# Patient Record
Sex: FEMALE | Race: WHITE | NOT HISPANIC OR LATINO | URBAN - METROPOLITAN AREA
[De-identification: names, ages, dates, MRNs, and addresses within clinical notes are randomized per-mention and may not be internally consistent; named-entity substitution may affect disease eponyms.]

---

## 2018-06-25 ENCOUNTER — AMBULATORY - HEALTHEAST (OUTPATIENT)
Dept: MULTI SPECIALTY CLINIC | Facility: CLINIC | Age: 33
End: 2018-06-25

## 2018-06-25 LAB
HPV_EXT - HISTORICAL: NORMAL
PAP SMEAR - HIM PATIENT REPORTED: NORMAL

## 2019-04-12 ENCOUNTER — AMBULATORY - HEALTHEAST (OUTPATIENT)
Dept: LAB | Facility: CLINIC | Age: 34
End: 2019-04-12

## 2019-04-12 DIAGNOSIS — O99.810 ABNORMAL MATERNAL GLUCOSE TOLERANCE, ANTEPARTUM: ICD-10-CM

## 2019-04-12 LAB
FASTING STATUS PATIENT QL REPORTED: YES
GLUCOSE 1H P 100 G GLC PO SERPL-MCNC: 189 MG/DL (ref 70–179)
GLUCOSE 2H P 100 G GLC PO SERPL-MCNC: 141 MG/DL (ref 70–154)
GLUCOSE 3H P 100 G GLC PO SERPL-MCNC: 102 MG/DL (ref 70–139)
GLUCOSE P FAST SERPL-MCNC: 90 MG/DL (ref 70–94)

## 2019-04-16 ENCOUNTER — AMBULATORY - HEALTHEAST (OUTPATIENT)
Dept: SCHEDULING | Facility: CLINIC | Age: 34
End: 2019-04-16

## 2019-04-16 DIAGNOSIS — O26.00 EXCESSIVE WEIGHT GAIN DURING PREGNANCY: ICD-10-CM

## 2019-06-17 ENCOUNTER — ANESTHESIA - HEALTHEAST (OUTPATIENT)
Dept: OBGYN | Facility: CLINIC | Age: 34
End: 2019-06-17

## 2019-06-17 ENCOUNTER — ANESTHESIA - HEALTHEAST (OUTPATIENT)
Dept: ANESTHESIOLOGY | Facility: CLINIC | Age: 34
End: 2019-06-17

## 2019-06-18 ENCOUNTER — HOME CARE/HOSPICE - HEALTHEAST (OUTPATIENT)
Dept: HOME HEALTH SERVICES | Facility: HOME HEALTH | Age: 34
End: 2019-06-18

## 2019-06-19 ENCOUNTER — COMMUNICATION - HEALTHEAST (OUTPATIENT)
Dept: OBGYN | Facility: CLINIC | Age: 34
End: 2019-06-19

## 2019-06-24 ENCOUNTER — COMMUNICATION - HEALTHEAST (OUTPATIENT)
Dept: ADMINISTRATIVE | Facility: CLINIC | Age: 34
End: 2019-06-24

## 2019-06-27 ENCOUNTER — RECORDS - HEALTHEAST (OUTPATIENT)
Dept: ADMINISTRATIVE | Facility: OTHER | Age: 34
End: 2019-06-27

## 2019-06-27 ENCOUNTER — OFFICE VISIT - HEALTHEAST (OUTPATIENT)
Dept: MIDWIFE SERVICES | Facility: CLINIC | Age: 34
End: 2019-06-27

## 2019-06-27 DIAGNOSIS — N61.0 INFECTION OF BREAST, RIGHT: ICD-10-CM

## 2021-05-29 NOTE — TELEPHONE ENCOUNTER
Pt was diagnosed with mastitis and given antibiotics but is having problems with milk blebs. She has had success with hand expressing while in the bathtub but has trouble pumping. She would like to speak with someone to see if there is anything she can do or if she should be seen in clinic. Pt can be reached at [phone]. Ok to leave a message if she doesn't answer.

## 2021-05-29 NOTE — ANESTHESIA PROCEDURE NOTES
Epidural Block    Patient location during procedure: OB  Time Called: 6/17/2019 6:33 AM  Reason for Block:labor epidural  Staffing:  Performing  Anesthesiologist: Andrew Layton MD  Preanesthetic Checklist  Completed: patient identified, risks, benefits, and alternatives discussed, timeout performed, consent obtained, at patient's request, airway assessed, oxygen available, suction available, emergency drugs available and hand hygiene performed  Procedure  Patient position: sitting  Prep: ChloraPrep and site prepped and draped  Patient monitoring: continuous pulse oximetry, heart rate and blood pressure  Approach: midline  Location: L3-L4  Injection technique: SOBEIDA saline  Number of Attempts:1  Needle  Needle type: Tuohy   Needle gauge: 17 G     : 7.  Assessment  Sensory level:  No complications      Additional Notes:  Attempted CSE due to pt feeling need to push, but no CSF flow via 5-in 25g pencil-point needle inserted via Tuohy.  Epidural catheter threaded easily.  SOBEIDA @ 7 cm, taped @ 14 cm

## 2021-05-29 NOTE — TELEPHONE ENCOUNTER
OB Follow Up Phone Call    Patient: Edie Mccarhty  : 1985  MRN: [id number]     Location [facility]   Provider aldenr    :   N/A    Language:   na    Discharge Follow-Up:  Follow-Up call by Outreach nurse: talked with Edie    Type of Delivery:      Feeding Method:  Breastfeeding    Feedings in 24Hrs:  >8x/Day pumping 1 oz per feed about every 2-2 1/2 hours    Breast engorgement:   No    Nipple tenderness:   Yes and sore right nipple    Non-nursing engorgement discussed:   N/A    Amount of Feeding:  na    Number of Infant Stools in 24 hours:   >3    Stool:  Transition    Number of Infant voids in 24 hours:  1-3    Urine:  Clear    Infant Jaundice:   Facial    Discussed increasing s/s of Jaundice:   Yes    Circumcision:   yes, looks good per mom, using vaseline    Maternal s/s of infection:   none    Maternal s/s of PIH:   Headache and HA from lack of sleep    Postpartum cramping:   None    Comfort:  Adequate with routine pain meds    Vaginal Bleeding:  WDL    S/S of Maternal Thrombosis:  None    Maternal BM Since Delivery:  Yes    Referrals:   none    Resources Used During Phone Call:  HEPS    Comments:   Milk coming in but sore right nipple and is now  Opting to pump and bottle as she did with prior child.  Nipple now healing.  She feels that walking has improved as well as sensation in legs and feet. She still feels weak but improving.    Completed by:   Kimberly A Seipel RN    Patient: Edie Mccarthy  : 1985  MRN: [id number]

## 2021-05-29 NOTE — ANESTHESIA POSTPROCEDURE EVALUATION
"Patient: Edie Mccarthy  Anesthesia type: regional    Patient location: Labor and Delivery    /72   Pulse 80   Temp 36.7  C (98  F) (Oral)   Resp 16   Ht 5' 3\" (1.6 m)   Wt 206 lb (93.4 kg)   SpO2 (!) 81%   Breastfeeding? Unknown   BMI 36.49 kg/m        Post vital signs: stable  Level of consciousness: awake and responds to simple questions  Post-anesthesia pain: pain controlled  Post-anesthesia nausea and vomiting: no  Pulmonary: unassisted, return to baseline  Cardiovascular: stable and blood pressure at baseline  Hydration: adequate  Anesthetic events: no    QCDR Measures:  ASA# 11 - Rhina-op Cardiac Arrest: ASA11B - Patient did NOT experience unanticipated cardiac arrest  ASA# 12 - Rhina-op Mortality Rate: ASA12B - Patient did NOT die  ASA# 13 - PACU Re-Intubation Rate: NA - No ETT / LMA used for case  ASA# 10 - Composite Anes Safety: ASA10A - No serious adverse event    Additional Notes:  Neuraxial block is wearing off appropriately. Pain controlled. Denies headache or back pain.    "

## 2021-05-29 NOTE — ANESTHESIA PREPROCEDURE EVALUATION
Anesthesia Evaluation      Patient summary reviewed   No history of anesthetic complications     Airway   Mallampati: II  Neck ROM: full   Pulmonary - negative ROS and normal exam                          Cardiovascular - negative ROS and normal exam  Exercise tolerance: > or = 4 METS   Neuro/Psych - negative ROS     Endo/Other    (+) obesity (per patient), pregnant     GI/Hepatic/Renal    (+) GERD,             Dental - normal exam                          Anesthesia Plan  Planned anesthetic: epidural  Patient requesting labor epidural. Patient interviewed and examined at the bedside with RN present throughout. Discussed with patient the procedure of epidural placement, expectations, and risks including but not limited to: decreased blood pressure, poor analgesia possibly requiring replacement, post-dural puncture headache, bleeding, infection, nerve damage, and high spinal block. All questions answered. Patient consents to proceed with epidural placement.    ASA 2     Anesthetic plan and risks discussed with: patient and spouse    Post-op plan: routine recovery

## 2021-05-29 NOTE — TELEPHONE ENCOUNTER
"S: Mom has been having difficulty with milk blebs and associated clogged ducts for the past few days. She does not get any milk when she pumps on the R side but is having success hand expressing into the tub - she can express \"strings\" of milk. She has a bled on her R nipple that looks like a white head. Her breasts do get softer after hand expressing in the tub. She was seen at urgent care yesterday and was prescribed antibiotics for mastitis at that time. She has been on antibiotics for less than 24 hours. She did have a fever yesterday and is beginning to feel better but is still not 100%. Yesterday she felt like she was \"run over by a truck.\" Her L breast has been unaffected. Baby was born on [birth year].    A: Milk bleb/clogs which led to mastitis. Mom is having some success hand expressing milk and clog is at least beginning to resolve.     P: Discussed starting Sunflower lecithin 3400 mg to treat/prevent future clogs. Referred mom to the [employer] website for more information. Encouraged mom to continue to hand express in the tub until clog has fully resolved as this has been working the best for her. Discussed adding epsom salts to bath or using a haakaa pump. Recommend \"breast lift\" and other strategies to relieve a clogged duct. Encouraged mom to give antibiotic more time to work but to make an appointment with midwife or OB if fever persists 48 hours after starting antibiotic. Also discussed possibility of using prescription steroid cream over bleb to thin skin if this does not resolve.     "

## 2021-05-30 NOTE — PROGRESS NOTES
"Subjective:Edie presents to clinic with her  today.  Her baby was born on 19.  Patient tells me that she was seen last Saturday by a physician for right breast mastitis.  She had been feeling exhausted, chilled, and achy all over prior to being seen.  She started a 7-day course of Keflex on Saturday and then on  morning  felt better.  However, on  night she developed a fever of 103.7 Fahrenheit which was taken by her home thermometer.  She did not seek care but instead got into a cool bath and began to hand express her right breast.  She tells me she was able to remove copious amounts of \"stringy\" firm material when hand expressing her right breast her  assisted her in hand expressing and she has been doing this every evening since .  Her body aches and chills have resolved and her breast \"does not feel great\" but it does not hurt.  She tells me her right breast feels \"beat up\".  She did call and speak with a nurse practitioner/IBCLC, on Monday, , who advised her to start using sunflower lecithin to treat and prevent clogged milk ducts (see Epic chart note).  She is feeling improved but feels nervous that she will feel worse again and would also like to stop hand expressing firm material from her breast.    Patient does not plan to put her baby to the breast.  She pumped and bottle fed her other children and plans to do the same with her current baby.  She successfully pumped in the past and has not had mastitis, a clogged duct, or any other difficulty until now.      /74 (Patient Site: Right Arm, Patient Position: Sitting, Cuff Size: Adult Regular)   Pulse 90   Temp 98.3  F (36.8  C)   Wt 193 lb (87.5 kg)   SpO2 98%   BMI 34.19 kg/m      Patient appears well, in no apparent distress.    Breast exam: Breasts appear symmetrical with the exception of nipples.  Right breast tenderness palpated between 5 and 9 o'clock.  Erythema and nipple discharge absent.  Scabbing " noted on right nipple.  Left nipple intact.      Assessment:   34-year-old -0-0-3, 10 days postpartum  Mastitis, currently being treated with antibiotics  Firm purulent material hand expressed from breast  Clogged milk duct versus breast abscess      Plan:  Right breast u/s  Complete Keflex Rx  Continue Sunflower lecithin 3400 mg to treat/prevent future clogged ducts  Provided blocked ducts and mastitis handout from [employer] website   Encouraged to continue hand expression  Return to clinic with a fever, increased symptoms, or new symptoms.      Total time spent with patient 30 minutes.  >50% of the time spent counseling and coordinating care.    Sowmya MCCORMACK CNM    2019  1:46 PM

## 2021-06-03 VITALS — BODY MASS INDEX: 34.19 KG/M2 | WEIGHT: 193 LBS

## 2021-06-16 PROBLEM — N61.0 INFECTION OF BREAST, RIGHT: Status: ACTIVE | Noted: 2019-06-27

## 2021-06-16 PROBLEM — Z34.90 PREGNANT: Status: ACTIVE | Noted: 2019-06-17

## 2021-06-20 ENCOUNTER — HEALTH MAINTENANCE LETTER (OUTPATIENT)
Age: 36
End: 2021-06-20

## 2021-10-11 ENCOUNTER — HEALTH MAINTENANCE LETTER (OUTPATIENT)
Age: 36
End: 2021-10-11

## 2022-07-17 ENCOUNTER — HEALTH MAINTENANCE LETTER (OUTPATIENT)
Age: 37
End: 2022-07-17

## 2022-09-25 ENCOUNTER — HEALTH MAINTENANCE LETTER (OUTPATIENT)
Age: 37
End: 2022-09-25

## 2023-08-05 ENCOUNTER — HEALTH MAINTENANCE LETTER (OUTPATIENT)
Age: 38
End: 2023-08-05